# Patient Record
Sex: FEMALE | Race: BLACK OR AFRICAN AMERICAN
[De-identification: names, ages, dates, MRNs, and addresses within clinical notes are randomized per-mention and may not be internally consistent; named-entity substitution may affect disease eponyms.]

---

## 2021-10-28 ENCOUNTER — P2P PATIENT RECORD (OUTPATIENT)
Age: 51
End: 2021-10-28

## 2021-11-11 ENCOUNTER — DASHBOARD ENCOUNTERS (OUTPATIENT)
Age: 51
End: 2021-11-11

## 2021-11-11 ENCOUNTER — OFFICE VISIT (OUTPATIENT)
Dept: URBAN - METROPOLITAN AREA CLINIC 92 | Facility: CLINIC | Age: 51
End: 2021-11-11
Payer: COMMERCIAL

## 2021-11-11 DIAGNOSIS — Z98.84 HISTORY OF ROUX-EN-Y GASTRIC BYPASS: ICD-10-CM

## 2021-11-11 DIAGNOSIS — D50.9 IRON DEFICIENCY ANEMIA, UNSPECIFIED IRON DEFICIENCY ANEMIA TYPE: ICD-10-CM

## 2021-11-11 PROCEDURE — 99204 OFFICE O/P NEW MOD 45 MIN: CPT | Performed by: INTERNAL MEDICINE

## 2021-11-11 RX ORDER — AMITRIPTYLINE HYDROCHLORIDE 10 MG/1
TAKE 1 TABLET (10 MG) BY ORAL ROUTE ONCE DAILY AT BEDTIME TABLET, FILM COATED ORAL 1
Qty: 90 | Refills: 3 | Status: ON HOLD | COMMUNITY
Start: 2018-10-17

## 2021-11-11 RX ORDER — HYOSCYAMINE SULFATE 0.12 MG/1
TAKE 1 TABLET (0.125 MG) BY ORAL ROUTE 4 TIMES PER DAY TABLET ORAL
Qty: 20 | Refills: 3 | Status: ON HOLD | COMMUNITY
Start: 2018-04-24

## 2021-11-11 RX ORDER — ALPRAZOLAM 0.5 MG
TABLET ORAL
Qty: 0 | Refills: 0 | Status: ON HOLD | COMMUNITY
Start: 1900-01-01

## 2021-11-11 NOTE — HPI-TODAY'S VISIT:
Is a 51-year-old -American female presents today for evaluation of recurrent iron deficiency.  She has no particular gastrointestinal complaints at this time.  Of note regarding her prior constipation this was improved after pelvic floor therapy.  She last had endoscopic evaluation 2017/2018.  There was no PillCam performed.  She has an oral iron intolerance.

## 2021-11-17 ENCOUNTER — OFFICE VISIT (OUTPATIENT)
Dept: URBAN - METROPOLITAN AREA TELEHEALTH 2 | Facility: TELEHEALTH | Age: 51
End: 2021-11-17
Payer: COMMERCIAL

## 2021-11-17 DIAGNOSIS — Z91.018 FOOD ALLERGY: ICD-10-CM

## 2021-11-17 DIAGNOSIS — K21.9 GERD: ICD-10-CM

## 2021-11-17 DIAGNOSIS — D50.8 OTHER IRON DEFICIENCY ANEMIA: ICD-10-CM

## 2021-11-17 PROCEDURE — 97802 MEDICAL NUTRITION INDIV IN: CPT | Performed by: DIETITIAN, REGISTERED

## 2021-11-17 RX ORDER — HYOSCYAMINE SULFATE 0.12 MG/1
TAKE 1 TABLET (0.125 MG) BY ORAL ROUTE 4 TIMES PER DAY TABLET ORAL
Qty: 20 | Refills: 3 | Status: ON HOLD | COMMUNITY
Start: 2018-04-24

## 2021-11-17 RX ORDER — ALPRAZOLAM 0.5 MG
TABLET ORAL
Qty: 0 | Refills: 0 | Status: ON HOLD | COMMUNITY
Start: 1900-01-01

## 2021-11-17 RX ORDER — AMITRIPTYLINE HYDROCHLORIDE 10 MG/1
TAKE 1 TABLET (10 MG) BY ORAL ROUTE ONCE DAILY AT BEDTIME TABLET, FILM COATED ORAL 1
Qty: 90 | Refills: 3 | Status: ON HOLD | COMMUNITY
Start: 2018-10-17

## 2021-11-17 NOTE — HPI-TODAY'S VISIT:
Nutrition initial visit:  Patient has a hx of bypass surgery.  She has had a few issues since then.  She had gallbladder removal in 2018.  Patient was tested by two different MDs for food allergies and tested positive to banana, beef, eggwhite, egg, nilda nut, lemon, cows milk HD, peanut HD, pineapple, english walnuts.  24 hour recall:  Decaf coffee with stevia in the raw, 1 cup peaches with stevia, chocolate chip enjoy life cookies 4-5.  Lunch 2 chicken thighs, 1 can green beans.  Dinner 2 chicken thighs and 1/2 large bag corn chips.  Sugar free popcicles and 1 fudgecicle.  2 16 oz bottles of [place] water with grape flavoring.  Dr Soriano in [place] did the allergy test.  [facility].

## 2022-01-21 ENCOUNTER — OFFICE VISIT (OUTPATIENT)
Dept: URBAN - METROPOLITAN AREA SURGERY CENTER 16 | Facility: SURGERY CENTER | Age: 52
End: 2022-01-21

## 2022-02-22 PROBLEM — 87522002: Status: ACTIVE | Noted: 2021-11-11

## 2022-03-10 ENCOUNTER — OFFICE VISIT (OUTPATIENT)
Dept: URBAN - METROPOLITAN AREA SURGERY CENTER 16 | Facility: SURGERY CENTER | Age: 52
End: 2022-03-10
Payer: COMMERCIAL

## 2022-03-10 DIAGNOSIS — D50.9 ANEMIA: ICD-10-CM

## 2022-03-10 DIAGNOSIS — Z98.84 BARIATRIC SURG STAT-UNSP: ICD-10-CM

## 2022-03-10 PROCEDURE — G8907 PT DOC NO EVENTS ON DISCHARG: HCPCS | Performed by: INTERNAL MEDICINE

## 2022-03-10 PROCEDURE — 45378 DIAGNOSTIC COLONOSCOPY: CPT | Performed by: INTERNAL MEDICINE

## 2022-03-10 PROCEDURE — 43235 EGD DIAGNOSTIC BRUSH WASH: CPT | Performed by: INTERNAL MEDICINE

## 2022-03-10 RX ORDER — HYOSCYAMINE SULFATE 0.12 MG/1
TAKE 1 TABLET (0.125 MG) BY ORAL ROUTE 4 TIMES PER DAY TABLET ORAL
Qty: 20 | Refills: 3 | Status: ON HOLD | COMMUNITY
Start: 2018-04-24

## 2022-03-10 RX ORDER — ALPRAZOLAM 0.5 MG
TABLET ORAL
Qty: 0 | Refills: 0 | Status: ON HOLD | COMMUNITY
Start: 1900-01-01

## 2022-03-10 RX ORDER — AMITRIPTYLINE HYDROCHLORIDE 10 MG/1
TAKE 1 TABLET (10 MG) BY ORAL ROUTE ONCE DAILY AT BEDTIME TABLET, FILM COATED ORAL 1
Qty: 90 | Refills: 3 | Status: ON HOLD | COMMUNITY
Start: 2018-10-17